# Patient Record
Sex: MALE | Race: WHITE | NOT HISPANIC OR LATINO | Employment: STUDENT | URBAN - METROPOLITAN AREA
[De-identification: names, ages, dates, MRNs, and addresses within clinical notes are randomized per-mention and may not be internally consistent; named-entity substitution may affect disease eponyms.]

---
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
## 2024-07-13 ENCOUNTER — HOSPITAL ENCOUNTER (EMERGENCY)
Facility: HOSPITAL | Age: 19
Discharge: HOME | End: 2024-07-13
Attending: EMERGENCY MEDICINE
Payer: MEDICAID

## 2024-07-13 VITALS
DIASTOLIC BLOOD PRESSURE: 68 MMHG | HEIGHT: 67 IN | BODY MASS INDEX: 28.25 KG/M2 | OXYGEN SATURATION: 98 % | WEIGHT: 180 LBS | RESPIRATION RATE: 16 BRPM | TEMPERATURE: 98 F | HEART RATE: 70 BPM | SYSTOLIC BLOOD PRESSURE: 119 MMHG

## 2024-07-13 DIAGNOSIS — S61.532A: Primary | ICD-10-CM

## 2024-07-13 PROCEDURE — 63600175 PHARM REV CODE 636 W HCPCS: Performed by: PHYSICIAN ASSISTANT

## 2024-07-13 PROCEDURE — 99284 EMERGENCY DEPT VISIT MOD MDM: CPT | Mod: 25

## 2024-07-13 PROCEDURE — 25000003 PHARM REV CODE 250: Performed by: PHYSICIAN ASSISTANT

## 2024-07-13 PROCEDURE — 90471 IMMUNIZATION ADMIN: CPT | Performed by: PHYSICIAN ASSISTANT

## 2024-07-13 PROCEDURE — 96372 THER/PROPH/DIAG INJ SC/IM: CPT | Performed by: PHYSICIAN ASSISTANT

## 2024-07-13 PROCEDURE — 90715 TDAP VACCINE 7 YRS/> IM: CPT | Performed by: PHYSICIAN ASSISTANT

## 2024-07-13 RX ORDER — ALBUTEROL SULFATE 90 UG/1
2 AEROSOL, METERED RESPIRATORY (INHALATION) EVERY 6 HOURS PRN
COMMUNITY
Start: 2023-10-10 | End: 2024-10-09

## 2024-07-13 RX ORDER — SULFAMETHOXAZOLE AND TRIMETHOPRIM 800; 160 MG/1; MG/1
2 TABLET ORAL
Status: COMPLETED | OUTPATIENT
Start: 2024-07-13 | End: 2024-07-13

## 2024-07-13 RX ORDER — KETOROLAC TROMETHAMINE 30 MG/ML
30 INJECTION, SOLUTION INTRAMUSCULAR; INTRAVENOUS
Status: COMPLETED | OUTPATIENT
Start: 2024-07-13 | End: 2024-07-13

## 2024-07-13 RX ORDER — SULFAMETHOXAZOLE AND TRIMETHOPRIM 800; 160 MG/1; MG/1
2 TABLET ORAL 2 TIMES DAILY
Qty: 28 TABLET | Refills: 0 | Status: SHIPPED | OUTPATIENT
Start: 2024-07-13 | End: 2024-07-20

## 2024-07-13 RX ADMIN — KETOROLAC TROMETHAMINE 30 MG: 30 INJECTION, SOLUTION INTRAMUSCULAR at 08:07

## 2024-07-13 RX ADMIN — TETANUS TOXOID, REDUCED DIPHTHERIA TOXOID AND ACELLULAR PERTUSSIS VACCINE, ADSORBED 0.5 ML: 5; 2.5; 8; 8; 2.5 SUSPENSION INTRAMUSCULAR at 08:07

## 2024-07-13 RX ADMIN — BACITRACIN ZINC, NEOMYCIN, POLYMYXIN B 1 EACH: 400; 3.5; 5 OINTMENT TOPICAL at 09:07

## 2024-07-13 RX ADMIN — SULFAMETHOXAZOLE AND TRIMETHOPRIM 2 TABLET: 800; 160 TABLET ORAL at 08:07

## 2024-07-14 NOTE — ED PROVIDER NOTES
Encounter Date: 7/13/2024       History     Chief Complaint   Patient presents with    Puncture Wound     Pt cut left wrist with a dirty nail about 30 min ago, last tetanus was 9 years ago     18-year-old male presents to ED complaining of puncture wound to left wrist sustained prior to arrival.    Patient was working with some wood which has been outside for a few months; states he became a bit frustrated and slammed the piece of wood down, it rebounded and a amanda nail protruding from the wood punctured him in the left ulnar wrist.  Presents to ED for evaluation.  Admits to pain, tenderness to the area.  No drainage.  Unsure if any retained foreign body.  Normal range of motion of the wrists with mild discomfort.  No uncontrolled bleeding.    R hand dominant    Depth of injury approx 1/2cm per pt    Last tetanus 2015    Past medical history:   Asthma  Chronic midline low back pain      Review of patient's allergies indicates:  No Known Allergies  Past Medical History:   Diagnosis Date    Asthma      History reviewed. No pertinent surgical history.  No family history on file.  Social History     Tobacco Use    Smoking status: Unknown     Review of Systems   Constitutional:  Negative for chills and fever.   Musculoskeletal:  Positive for arthralgias. Negative for joint swelling.   Skin:  Positive for wound.   Neurological:  Negative for syncope.       Physical Exam     Initial Vitals [07/13/24 1912]   BP Pulse Resp Temp SpO2   122/70 69 18 98 °F (36.7 °C) 99 %      MAP       --         Physical Exam    Nursing note and vitals reviewed.  Constitutional: He appears well-developed and well-nourished. He is not diaphoretic. No distress.   HENT:   Head: Normocephalic and atraumatic.   Neck: Neck supple.   Normal range of motion.  Cardiovascular:  Intact distal pulses.           Pulmonary/Chest: No respiratory distress.   Musculoskeletal:         General: Normal range of motion.      Cervical back: Normal range of motion  and neck supple.      Comments: L ulnar wrist (lateral aspect of ulnar wrist just distal to styloid) with small puncture, mild associated tenderness, no bleeding, no obvious foreign body.  No bony deformity.  Full active range of motion of wrist with mild discomfort.     Neurological: He is alert and oriented to person, place, and time. GCS score is 15. GCS eye subscore is 4. GCS verbal subscore is 5. GCS motor subscore is 6.   Skin: Skin is warm.   Psychiatric: He has a normal mood and affect. Thought content normal.         ED Course   Procedures  Labs Reviewed - No data to display       Imaging Results              X-Ray Wrist Complete Left (Final result)  Result time 07/13/24 21:18:19      Final result by Porter Webber MD (07/13/24 21:18:19)                   Impression:      No acute osseous abnormality identified.      Electronically signed by: Porter Webber MD  Date:    07/13/2024  Time:    21:18               Narrative:    EXAMINATION:  XR WRIST COMPLETE 3 VIEWS LEFT    CLINICAL HISTORY:  Puncture wound without foreign body of left wrist, initial encounter    TECHNIQUE:  PA, lateral, and oblique views of the left wrist were performed.    COMPARISON:  None    FINDINGS:  No evidence of acute displaced fracture, dislocation, or osseous destructive process.  No radiopaque retained foreign body seen.                                       Medications   Tdap (BOOSTRIX) vaccine injection 0.5 mL (0.5 mLs Intramuscular Given 7/13/24 2055)   sulfamethoxazole-trimethoprim 800-160mg per tablet 2 tablet (2 tablets Oral Given 7/13/24 2055)   ketorolac injection 30 mg (30 mg Intramuscular Given 7/13/24 2055)   neomycin-bacitracnZn-polymyxnB packet 1 each (1 each Topical (Top) Given 7/13/24 2135)     Medical Decision Making  Differential diagnosis: Puncture wound, retained foreign body, abscess, cellulitis    Amount and/or Complexity of Data Reviewed  Radiology: ordered and independent interpretation performed.  Decision-making details documented in ED Course.  Discussion of management or test interpretation with external provider(s): Tetanus updated.  Given degree of tenderness, painful range of motion, x-ray pending to rule out foreign body, will start on antibiotics given aforementioned.    Discussed possibility of developing septic joint given puncture wound to the wrist joint; discuss need for return if fever chills at any time, if worsening symptoms such as pain, purulent drainage, joint stiffness after 48 hours of antibiotics, discussed red flags and interim return precautions.  He is comfortable with current plan.  I have low suspicion for septic joint at this time.  He is placed on Bactrim.    Risk  OTC drugs.  Prescription drug management.                                      Clinical Impression:  Final diagnoses:  [D81.864N] Puncture wound of left wrist (Primary)          ED Disposition Condition    Discharge Stable          ED Prescriptions       Medication Sig Dispense Start Date End Date Auth. Provider    sulfamethoxazole-trimethoprim 800-160mg (BACTRIM DS) 800-160 mg Tab Take 2 tablets by mouth 2 (two) times daily. for 7 days 28 tablet 7/13/2024 7/20/2024 Morgan Meadows PA-C          Follow-up Information       Follow up With Specialties Details Why Contact Info    [facility]  - Primary Care  Schedule an appointment as soon as possible for a visit  For reevaluation, For wound re-check, If symptoms persist [address] [place] [address]   [phone]                    Morgan Meadows PA-C  07/14/24 0826

## 2024-07-14 NOTE — DISCHARGE INSTRUCTIONS
Ibuprofen, Tylenol as needed for pain.  Warm, moist compresses to the area to encourage continued drainage if there is any underlying infection or small foreign body.  Clean the wound gently with soap and water twice daily, apply topical antibiotics such as Neosporin to the wound twice daily.  Take oral antibiotics as prescribed, try to take with meals to limit nausea.    Follow-up with primary care provider for repeat exam and re-evaluation of any persistent symptoms.    Return to this ED if you develop fever or chills, if worsening pain and swelling despite 48 hours of antibiotics, if you develop severe joint pain and stiffness, if any other problems occur.

## 2024-07-14 NOTE — ED TRIAGE NOTES
Pt reports he was wood working and accidentally poked his left hand with a nail.  Puncture wound to left palm, lateral side.  Hx: asthma.